# Patient Record
Sex: FEMALE | Race: WHITE | NOT HISPANIC OR LATINO
[De-identification: names, ages, dates, MRNs, and addresses within clinical notes are randomized per-mention and may not be internally consistent; named-entity substitution may affect disease eponyms.]

---

## 2019-07-19 ENCOUNTER — RX RENEWAL (OUTPATIENT)
Age: 34
End: 2019-07-19

## 2019-07-29 ENCOUNTER — RECORD ABSTRACTING (OUTPATIENT)
Age: 34
End: 2019-07-29

## 2019-07-29 DIAGNOSIS — Z83.79 FAMILY HISTORY OF OTHER DISEASES OF THE DIGESTIVE SYSTEM: ICD-10-CM

## 2019-07-29 DIAGNOSIS — Z80.8 FAMILY HISTORY OF MALIGNANT NEOPLASM OF OTHER ORGANS OR SYSTEMS: ICD-10-CM

## 2019-07-29 DIAGNOSIS — Z82.49 FAMILY HISTORY OF ISCHEMIC HEART DISEASE AND OTHER DISEASES OF THE CIRCULATORY SYSTEM: ICD-10-CM

## 2019-07-29 DIAGNOSIS — Z83.42 FAMILY HISTORY OF FAMILIAL HYPERCHOLESTEROLEMIA: ICD-10-CM

## 2019-07-29 DIAGNOSIS — T63.441A TOXIC EFFECT OF VENOM OF BEES, ACCIDENTAL (UNINTENTIONAL), INITIAL ENCOUNTER: ICD-10-CM

## 2019-07-29 DIAGNOSIS — Z87.42 PERSONAL HISTORY OF OTHER DISEASES OF THE FEMALE GENITAL TRACT: ICD-10-CM

## 2019-07-29 DIAGNOSIS — Z78.9 OTHER SPECIFIED HEALTH STATUS: ICD-10-CM

## 2019-07-29 DIAGNOSIS — Z86.39 PERSONAL HISTORY OF OTHER ENDOCRINE, NUTRITIONAL AND METABOLIC DISEASE: ICD-10-CM

## 2019-07-29 LAB — CYTOLOGY CVX/VAG DOC THIN PREP: NEGATIVE

## 2019-10-01 ENCOUNTER — APPOINTMENT (OUTPATIENT)
Dept: OBGYN | Facility: CLINIC | Age: 34
End: 2019-10-01
Payer: COMMERCIAL

## 2019-10-01 ENCOUNTER — TRANSCRIPTION ENCOUNTER (OUTPATIENT)
Age: 34
End: 2019-10-01

## 2019-10-01 PROCEDURE — 99395 PREV VISIT EST AGE 18-39: CPT

## 2019-10-03 LAB — HPV HIGH+LOW RISK DNA PNL CVX: NOT DETECTED

## 2019-10-03 NOTE — HISTORY OF PRESENT ILLNESS
[1 Year Ago] : 1 year ago [Good] : being in good health [Healthy Diet] : a healthy diet [Regular Exercise] : regular exercise [Definite:  ___ (Date)] : the last menstrual period was [unfilled] [Normal Amount/Duration] : was of a normal amount and duration [Spotting Between  Menses] : no spotting between menses [Menstrual Cramps] : no menstrual cramps [Regular Cycle Intervals] : periods have been regular [Sexually Active] : is sexually active [Monogamous] : is monogamous

## 2019-10-06 LAB — CYTOLOGY CVX/VAG DOC THIN PREP: NORMAL

## 2021-04-14 ENCOUNTER — LABORATORY RESULT (OUTPATIENT)
Age: 36
End: 2021-04-14

## 2021-04-14 ENCOUNTER — APPOINTMENT (OUTPATIENT)
Dept: OBGYN | Facility: CLINIC | Age: 36
End: 2021-04-14
Payer: COMMERCIAL

## 2021-04-14 DIAGNOSIS — Z01.419 ENCOUNTER FOR GYNECOLOGICAL EXAMINATION (GENERAL) (ROUTINE) W/OUT ABNORMAL FINDINGS: ICD-10-CM

## 2021-04-14 DIAGNOSIS — Z11.51 ENCOUNTER FOR SCREENING FOR HUMAN PAPILLOMAVIRUS (HPV): ICD-10-CM

## 2021-04-14 DIAGNOSIS — Z12.31 ENCOUNTER FOR SCREENING MAMMOGRAM FOR MALIGNANT NEOPLASM OF BREAST: ICD-10-CM

## 2021-04-14 PROCEDURE — 99072 ADDL SUPL MATRL&STAF TM PHE: CPT

## 2021-04-14 PROCEDURE — 99395 PREV VISIT EST AGE 18-39: CPT

## 2021-04-22 PROBLEM — Z01.419 ENCOUNTER FOR ANNUAL ROUTINE GYNECOLOGICAL EXAMINATION: Status: RESOLVED | Noted: 2021-04-14 | Resolved: 2021-04-28

## 2021-04-22 PROBLEM — Z11.51 SCREENING FOR HPV (HUMAN PAPILLOMAVIRUS): Status: ACTIVE | Noted: 2019-10-03

## 2021-04-22 NOTE — HISTORY OF PRESENT ILLNESS
[FreeTextEntry1] : 35 y o P0 female presents for routine annual GYN care\par Last pap smear 10/2019 NILM HPV negative\par Denies current GYN complaints\par Reports normal bowel and bladder function\par Sexually active w/o concerns: uses combination OC without adverse effects\par Has monthly menses: normal flow/duration

## 2021-08-25 LAB
CYTOLOGY CVX/VAG DOC THIN PREP: NORMAL
HPV HIGH+LOW RISK DNA PNL CVX: DETECTED

## 2022-02-22 ENCOUNTER — RX RENEWAL (OUTPATIENT)
Age: 37
End: 2022-02-22

## 2022-04-26 ENCOUNTER — LABORATORY RESULT (OUTPATIENT)
Age: 37
End: 2022-04-26

## 2022-04-26 ENCOUNTER — APPOINTMENT (OUTPATIENT)
Dept: OBGYN | Facility: CLINIC | Age: 37
End: 2022-04-26
Payer: COMMERCIAL

## 2022-04-26 VITALS
BODY MASS INDEX: 32.15 KG/M2 | DIASTOLIC BLOOD PRESSURE: 70 MMHG | SYSTOLIC BLOOD PRESSURE: 118 MMHG | HEIGHT: 65 IN | WEIGHT: 193 LBS

## 2022-04-26 PROCEDURE — 99395 PREV VISIT EST AGE 18-39: CPT

## 2022-04-26 NOTE — PHYSICAL EXAM
[Chaperone Declined] : Patient declined chaperone [Appropriately responsive] : appropriately responsive [Alert] : alert [No Acute Distress] : no acute distress [No Lymphadenopathy] : no lymphadenopathy [Soft] : soft [Non-tender] : non-tender [Non-distended] : non-distended [No HSM] : No HSM [No Lesions] : no lesions [No Mass] : no mass [Oriented x3] : oriented x3 [Examination Of The Breasts] : a normal appearance [No Masses] : no breast masses were palpable [Labia Minora] : normal [Labia Majora] : normal [Normal] : normal [Uterine Adnexae] : normal

## 2022-04-26 NOTE — HISTORY OF PRESENT ILLNESS
[TextBox_4] : 35yo P0 here for routine annual/ new to me, former LC then VD pt.\par Reg periods on OCP and happy with method.  She went on it as a teenager for heavy irreg periods and ovarian cysts an dhas had no problems since then.\par Not currently sexually active.  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

## 2022-05-05 LAB
CYTOLOGY CVX/VAG DOC THIN PREP: NORMAL
HPV HIGH+LOW RISK DNA PNL CVX: DETECTED

## 2022-05-25 ENCOUNTER — APPOINTMENT (OUTPATIENT)
Dept: BARIATRICS/WEIGHT MGMT | Facility: CLINIC | Age: 37
End: 2022-05-25
Payer: COMMERCIAL

## 2022-05-25 VITALS
HEIGHT: 65 IN | OXYGEN SATURATION: 99 % | DIASTOLIC BLOOD PRESSURE: 70 MMHG | SYSTOLIC BLOOD PRESSURE: 126 MMHG | BODY MASS INDEX: 32.32 KG/M2 | HEART RATE: 80 BPM | WEIGHT: 194 LBS | RESPIRATION RATE: 17 BRPM

## 2022-05-25 DIAGNOSIS — Z87.19 PERSONAL HISTORY OF OTHER DISEASES OF THE DIGESTIVE SYSTEM: ICD-10-CM

## 2022-05-25 PROCEDURE — 99205 OFFICE O/P NEW HI 60 MIN: CPT

## 2022-05-26 PROBLEM — Z87.19 HISTORY OF GASTROESOPHAGEAL REFLUX (GERD): Status: RESOLVED | Noted: 2022-05-26 | Resolved: 2022-05-26

## 2022-05-26 NOTE — REASON FOR VISIT
[Initial Consultation] : an initial consultation for [Obesity] : obesity [Hyperlipidemia] : hyperlipidemia

## 2022-06-02 RX ORDER — PEN NEEDLE, DIABETIC 32 GX 1/6"
32G X 4 MM NEEDLE, DISPOSABLE MISCELLANEOUS
Qty: 12 | Refills: 0 | Status: ACTIVE | COMMUNITY
Start: 2022-05-25 | End: 1900-01-01

## 2022-06-03 RX ORDER — LIRAGLUTIDE 6 MG/ML
18 INJECTION, SOLUTION SUBCUTANEOUS
Qty: 1 | Refills: 1 | Status: DISCONTINUED | COMMUNITY
Start: 2022-05-25 | End: 2022-06-03

## 2022-06-20 ENCOUNTER — APPOINTMENT (OUTPATIENT)
Dept: BARIATRICS/WEIGHT MGMT | Facility: CLINIC | Age: 37
End: 2022-06-20
Payer: COMMERCIAL

## 2022-06-20 VITALS
HEART RATE: 76 BPM | HEIGHT: 65 IN | SYSTOLIC BLOOD PRESSURE: 110 MMHG | DIASTOLIC BLOOD PRESSURE: 74 MMHG | RESPIRATION RATE: 16 BRPM | BODY MASS INDEX: 30.99 KG/M2 | OXYGEN SATURATION: 98 % | WEIGHT: 186 LBS

## 2022-06-20 PROCEDURE — 99214 OFFICE O/P EST MOD 30 MIN: CPT

## 2022-06-20 NOTE — PHYSICAL EXAM
[Obese, well nourished, in no acute distress] : obese, well nourished, in no acute distress [Normal] : affect appropriate [de-identified] : T

## 2022-06-20 NOTE — HISTORY OF PRESENT ILLNESS
[FreeTextEntry1] : 36F PMH class I obesity, HLD, s/p cholecystectomy, ?pre-DM, GERD who presents to weight management for follow-up.\par \par She initially presented ~4 weeks ago (5/2022) reporting hx of "yoyo" w/diet and weight on numerous episodes on weight watchers and has tried multiple other methods as well. Her weight tends to drop to ~160's but often come back to 190's. She had found it substantially more difficult to lose weight on her most recent episode of weight watchers than she had in the past. \par Her diet was noted for some processed/red meats (ie chicken sausage, steak), sweets/snack-food (ie oreos), artificial sweeteners and liquid kcal (ie wine). Some social snacking/grazing non-mindful eating noted. She was active through daily yoga and walking her dog. No clear concerns for sleep habits were observed.\par She was recommended to start general food tracking (less focus on kcal/macronutrients than general intake), pre-portion meals, and focus on un-processed plant-forward whole foods. We discussed eating mindfully and primarily at meals, minimizing artificial sweeteners, and consider reducing EtOH intake as well.\par She was prescribed semaglutide.\par \par Weight today: 186 lbs, BM 30.95\par Weight at Initial Visit (5/25/22): 194 lbs, BMI 32.28\par \par Medication: She started Ozempic 0.25 mg/wk on 6/2/22, has taken 3 doses. Notes she sometimes feels mild nausea in the sense that she feels queasy and averse to food, does not feel like eating. \par \par Sometimes skips meals because of lack of appetite. Has been eating meals out but is watching portions/diet. When she skips dinner she is hungrier at breakfast. Has some vacations coming up, is trying to plan how she will eat during that time.\par \par Exercise: Consistent with Yoga every morning for 30 minutes, walks dog daily.

## 2022-06-20 NOTE — HISTORY OF PRESENT ILLNESS
[FreeTextEntry1] : 36F PMH class I obesity, HLD, s/p cholecystectomy, ?pre-DM, GERD who presents to weight management for initial evaluation.\par \par She presents as a referral from a friend who was seen in the clinic.\par \par Weight/Diet History:\par She has done weight watchers many (6+) times over the past 8 years. Generally she bounces between her current weight (~190 lbs) and 160/165 lbs. More recently, she did Weight Watchers from Jan 2021 to Aug 2021, went to all meetings, tracked her diet, yet only lost only 5 lbs. She feels she wasn't losing as she had in the past. She gained weight up to 197 lbs, has tried other methods ie noom, MyFitnessPal.\par \par Weight today: 194 lbs, BMI 32.28\par \par Diet: Gets up 5:00 am, does yoga at 6 am. She is socially busy on weekends, her meals will all start later and she will do more social eating. \par \par Coffee w/oat milk or almond milk, plus splenda, in the car.\par B (8:30):  At work, low-fat greek yogurt and fruit\par L (12): Salad with asparagus, corn, cucumber. Sometimes chicken sausage\par D (6:00): [place], potatoes, asparagus. Sometimes steak or pasta. Then maybe oreos and wine\par Dessert: [facility] kiss\par Snack: Pear or cherries in afternoon\par Beverages: Water, coffee, some wine. Crystal lite\par Night-time eating:\par Fast-food/Restaurants: 1x/wk\par Food Journal: MyFitnessPal\par \par Exercise: Yoga every morning for 30 minutes. Walks dog daily.\par \par Sleep: Sleeps well, from 9 pm to 5 am. Thinks she may snore mildly, feels rested in the mornings. \par \par Social Hx: Denies tobacco or drug use. Drinks alcohol socially, estimates 4 glasses/wk (wine) during the year, 1 glass per day in the summer. 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 \par She has blood work that was performed on 3/29/22.

## 2022-06-20 NOTE — ASSESSMENT
[FreeTextEntry1] : 36F PMH class I obesity, HLD, s/p cholecystectomy, ?pre-DM, GERD who presents to weight management for follow-up.\par \par # Class I obesity c/b HLD, pre-DM, GERD, hx cholecystectomy: Weight today 186 lbs, BM 30.95, she has lost 8 lbs since initial visit <4 weeks ago. She has taken 3 doses of Ozempic 0.25 mg/wk and tolerating well, some mild nausea and early satiety noted. May be shifting toward earlier intake. Exercise is consistent. \par - Food log\par - Pre-portion meals\par - Focus on un-processed whole foods, plant-forward\par - Eating mindfully, primarily at meals\par - Minimize artificial sweeteners, water is best.\par - May benefit from reducing frequency of alcohol intake\par - C/w current measures\par - C/w Ozempic 0.25 mg/wk for at least 4 doses, plan to increase to 0.5 mg/wk at 5th dose, we discussed that she may choose to stay on 0.25 mg/wk dose for an extended period of time if she continues to either have side effects or effective weight loss at that dose. \par - F/u in ~4 weeks.

## 2022-06-20 NOTE — ASSESSMENT
[FreeTextEntry1] : 36F PMH class I obesity, HLD, s/p cholecystectomy, ?pre-DM, GERD who presents to weight management for initial evaluation.\par \par # Class I obesity c/b HLD, pre-DM, GERD, hx cholecystectomy: Weight today 194 lbs, BMI 32.28. History of fluctuating between 160's and 190's lbs, primarily has used weight watchers, no longer having success despite adhering to dietary modifications, tracking, and social support. Her diet is notable for some processed/red meats (ie chicken sausage, steak), sweets/snack-food (ie oreos), artificial sweeteners and liquid kcal (ie wine). Social snacking/grazing and eating often not mindful. She does yoga daily, and walks her dog. Sleep habits appear healthy.\par - Recommend restarting food log, less focus on kcal/macronutrients\par - Pre-portion meals\par - Discussed dietary recommendations, focus on un-processed whole foods, plant-forward\par - Eating mindfully, primarily at meals\par - Minimize artificial sweeteners, water is best.\par - May benefit from reducing frequency of alcohol intake\par \par \par Patient provided info\par [facility], [phone] (mail order pharmacy)\par prior authorization [id number]

## 2022-07-25 ENCOUNTER — APPOINTMENT (OUTPATIENT)
Dept: BARIATRICS/WEIGHT MGMT | Facility: CLINIC | Age: 37
End: 2022-07-25

## 2022-07-25 ENCOUNTER — NON-APPOINTMENT (OUTPATIENT)
Age: 37
End: 2022-07-25

## 2022-07-25 VITALS — HEIGHT: 65 IN | WEIGHT: 180 LBS | BODY MASS INDEX: 29.99 KG/M2

## 2022-07-25 PROCEDURE — 99213 OFFICE O/P EST LOW 20 MIN: CPT | Mod: GC,95

## 2022-07-26 NOTE — ASSESSMENT
[FreeTextEntry1] : 36F PMH class I obesity, HLD, s/p cholecystectomy, ?pre-DM, GERD who presents to weight management for follow-up.\par \par # Class I obesity c/b HLD, pre-DM, GERD, hx cholecystectomy: Weight today 180 lbs. BMI 29.95. She is tolerating Ozempic 0.5 mg w/o side effect, noting good effect on appetite and weight loss. She is incorporating healthier dietary habits and maintaining physical activity, some limitations since last visit due to vacations. \par - Food log\par - Pre-portion meals\par - Focus on un-processed whole foods, plant-forward\par - Eating mindfully, primarily at meals\par - Minimize artificial sweeteners, water is best.\par - May benefit from reducing frequency of alcohol intake\par - C/w current measures\par - C/w Ozempic 0.5 mg/wk for at least 4 doses, plan to increase to 1.0 mg/wk after 4th dose\par - F/u in ~4-6 weeks.

## 2022-07-26 NOTE — HISTORY OF PRESENT ILLNESS
[Home] : at home, [unfilled] , at the time of the visit. [Medical Office: ([facility])___] : at the medical office located in  [Verbal consent obtained from patient] : the patient, [unfilled] [FreeTextEntry1] : 36F PMH class I obesity, HLD, s/p cholecystectomy, ?pre-DM, GERD who presents to weight management for follow-up.\par \par She initially presented 5/2022 reporting hx of "yoyo" w/diet and weight on numerous episodes on weight watchers and has tried multiple other methods as well. Her weight tends to drop to ~160's but often come back to 190's. She had found it substantially more difficult to lose weight on her most recent episode of weight watchers than she had in the past. \par Her diet was noted for some processed/red meats (ie chicken sausage, steak), sweets/snack-food (ie oreos), artificial sweeteners and liquid kcal (ie wine). Some social snacking/grazing non-mindful eating noted. She was active through daily yoga and walking her dog. No clear concerns for sleep habits were observed.\par She was recommended to start general food tracking (less focus on kcal/macronutrients than general intake), pre-portion meals, and focus on un-processed plant-forward whole foods. We discussed eating mindfully and primarily at meals, minimizing artificial sweeteners, and consider reducing EtOH intake as well.\par She was prescribed semaglutide.\par \par Weight today: 180 lbs. BMI 29.95\par Weight at Last Visit (6/20/22): 186 lbs, BM 30.95\par Weight at Initial Visit (5/25/22): 194 lbs, BMI 32.28\par \par She has been on Ozempic 0.5 mg/wk s/p 3rd dose. Some days finds it affects her eating habits significantly but other days doesn't feel like it affects her as much. \par \par She had been traveling a lot since last seen, so she was less able to focus on dietary and exercise interventions. \par She did some swimming and yoga most days while traveling.

## 2022-07-29 ENCOUNTER — APPOINTMENT (OUTPATIENT)
Dept: OBGYN | Facility: CLINIC | Age: 37
End: 2022-07-29
Payer: COMMERCIAL

## 2022-07-29 VITALS
BODY MASS INDEX: 30.16 KG/M2 | HEIGHT: 65 IN | SYSTOLIC BLOOD PRESSURE: 110 MMHG | WEIGHT: 181 LBS | DIASTOLIC BLOOD PRESSURE: 74 MMHG

## 2022-07-29 PROCEDURE — 57454 BX/CURETT OF CERVIX W/SCOPE: CPT

## 2022-07-29 PROCEDURE — 81025 URINE PREGNANCY TEST: CPT | Mod: 1L

## 2022-07-29 RX ORDER — HUMAN PAPILLOMAVIRUS 9-VALENT VACCINE, RECOMBINANT 30; 40; 60; 40; 20; 20; 20; 20; 20 UG/.5ML; UG/.5ML; UG/.5ML; UG/.5ML; UG/.5ML; UG/.5ML; UG/.5ML; UG/.5ML; UG/.5ML
INJECTION, SUSPENSION INTRAMUSCULAR
Qty: 1 | Refills: 2 | Status: ACTIVE | COMMUNITY
Start: 2022-07-29 | End: 1900-01-01

## 2022-08-11 ENCOUNTER — NON-APPOINTMENT (OUTPATIENT)
Age: 37
End: 2022-08-11

## 2022-09-25 RX ORDER — HUMAN PAPILLOMAVIRUS 9-VALENT VACCINE, RECOMBINANT 30; 40; 60; 40; 20; 20; 20; 20; 20 UG/.5ML; UG/.5ML; UG/.5ML; UG/.5ML; UG/.5ML; UG/.5ML; UG/.5ML; UG/.5ML; UG/.5ML
INJECTION, SUSPENSION INTRAMUSCULAR
Qty: 1 | Refills: 1 | Status: ACTIVE | COMMUNITY
Start: 2022-08-04

## 2022-09-26 ENCOUNTER — APPOINTMENT (OUTPATIENT)
Dept: BARIATRICS/WEIGHT MGMT | Facility: CLINIC | Age: 37
End: 2022-09-26

## 2022-09-26 VITALS — BODY MASS INDEX: 28.49 KG/M2 | WEIGHT: 171 LBS | HEIGHT: 65 IN

## 2022-09-26 PROCEDURE — 99214 OFFICE O/P EST MOD 30 MIN: CPT | Mod: 95

## 2022-09-27 NOTE — ASSESSMENT
[FreeTextEntry1] : 36F PMH class I obesity, HLD, s/p cholecystectomy, ?pre-DM, GERD who presents to weight management for follow-up.\par \par # Class I obesity c/b HLD, pre-DM, GERD, hx cholecystectomy: Weight today 171 lbs. BMI  28.46, has lost 9 lbs since last visit 2 months ago, and 23 lbs since initial visit 4 months ago. She is tolerating Ozempic 1.0 well. Some maladaptive dietary behaviors including eating more sweets instead of more filling fibrous/protein-rich foods, but eating more of food earlier. \par - Food log\par - Start with vegetables / protein in her meal\par - Pre-portion meals\par - Focus on un-processed whole foods, plant-forward\par - Eating mindfully, primarily at meals\par - Minimize artificial sweeteners, water is best.\par - C/w current measures\par - C/w Ozempic 1.0 mg/wk\par - F/u in ~4-8 weeks.

## 2022-09-27 NOTE — HISTORY OF PRESENT ILLNESS
[Home] : at home, [unfilled] , at the time of the visit. [Medical Office: ([facility])___] : at the medical office located in  [Verbal consent obtained from patient] : the patient, [unfilled] [FreeTextEntry1] : 36F PMH class I obesity, HLD, s/p cholecystectomy, ?pre-DM, GERD who presents to weight management for follow-up.\par \par She initially presented 5/2022 reporting hx of "yoyo" w/diet and weight on numerous episodes on weight watchers and has tried multiple other methods as well. Her weight tends to drop to ~160's but often come back to 190's. She had found it substantially more difficult to lose weight on her most recent episode of weight watchers than she had in the past. \par Her diet was noted for some processed/red meats (ie chicken sausage, steak), sweets/snack-food (ie oreos), artificial sweeteners and liquid kcal (ie wine). Some social snacking/grazing non-mindful eating noted. She was active through daily yoga and walking her dog. No clear concerns for sleep habits were observed.\par She was recommended to start general food tracking (less focus on kcal/macronutrients than general intake), pre-portion meals, and focus on un-processed plant-forward whole foods. We discussed eating mindfully and primarily at meals, minimizing artificial sweeteners, and consider reducing EtOH intake as well.\par She was prescribed semaglutide.\par \par Weight today: 171 lbs. BMI  28.46\par Weight at Last Visit (7/25/22): 180 lbs. BMI 29.95\par Weight at Initial Visit (5/25/22): 194 lbs, BMI 32.28\par \par She has been on Ozempic 1.0 mg/wk, on 2nd pen. At beginning more side effects, but seems to be adapting.\par Now craving sweets more, less craving vegetables/protein.\par \par Eating more of her calories in the morning

## 2022-09-27 NOTE — PHYSICAL EXAM
[Obese, well nourished, in no acute distress] : obese, well nourished, in no acute distress [Normal] : affect appropriate [de-identified] : TEB appointment

## 2022-11-06 ENCOUNTER — NON-APPOINTMENT (OUTPATIENT)
Age: 37
End: 2022-11-06

## 2022-11-09 ENCOUNTER — APPOINTMENT (OUTPATIENT)
Dept: BARIATRICS/WEIGHT MGMT | Facility: CLINIC | Age: 37
End: 2022-11-09

## 2022-11-09 VITALS — HEIGHT: 65 IN | BODY MASS INDEX: 27.89 KG/M2 | WEIGHT: 167.4 LBS

## 2022-11-09 PROCEDURE — 99214 OFFICE O/P EST MOD 30 MIN: CPT | Mod: 95

## 2022-11-09 NOTE — PHYSICAL EXAM
[Obese, well nourished, in no acute distress] : obese, well nourished, in no acute distress [Normal] : affect appropriate [de-identified] : TEB appointment

## 2022-11-09 NOTE — ASSESSMENT
[FreeTextEntry1] : 36F PMH class I obesity, HLD, s/p cholecystectomy, ?pre-DM, GERD who presents to weight management for follow-up.\par \par # Class I obesity c/b HLD, pre-DM, GERD, hx cholecystectomy: Weight today 167.4 lbs, BMI 27.86, was 194 lbs at initial visit. Tolerating Ozempic 1.0 mg well for the past few months, has been maintaining her current weight range. Physically active. \par - Food log\par - Start with vegetables / protein in her meal\par - Pre-portion meals\par - Focus on un-processed whole foods, plant-forward\par - Eating mindfully, primarily at meals\par - Minimize artificial sweeteners, water is best.\par - Increase Ozempic to 2.0 mg/wk\par - F/u in ~4-8 weeks.

## 2022-11-09 NOTE — HISTORY OF PRESENT ILLNESS
[Home] : at home, [unfilled] , at the time of the visit. [Medical Office: ([facility])___] : at the medical office located in  [Verbal consent obtained from patient] : the patient, [unfilled] [FreeTextEntry1] : 36F PMH class I obesity, HLD, s/p cholecystectomy, ?pre-DM, GERD who presents to weight management for follow-up.\par \par She initially presented 5/2022 reporting hx of "yoyo" w/diet and weight on numerous episodes on weight watchers and has tried multiple other methods as well. Her weight tends to drop to ~160's but often come back to 190's. She had found it substantially more difficult to lose weight on her most recent episode of weight watchers than she had in the past. \par Her diet was noted for some processed/red meats (ie chicken sausage, steak), sweets/snack-food (ie oreos), artificial sweeteners and liquid kcal (ie wine). Some social snacking/grazing non-mindful eating noted. She was active through daily yoga and walking her dog. No clear concerns for sleep habits were observed.\par She was recommended to start general food tracking (less focus on kcal/macronutrients than general intake), pre-portion meals, and focus on un-processed plant-forward whole foods. We discussed eating mindfully and primarily at meals, minimizing artificial sweeteners, and consider reducing EtOH intake as well.\par She was prescribed semaglutide.\par \par Weight today: 167.4 lbs, BMI 27.86\par Weight at Last Visit (9/26/22): 171 lbs. BMI 28.46\par Weight at Initial Visit (5/25/22): 194 lbs, BMI 32.28\par \par Last visit we decided to c/w Ozempic 1.0 mg/wk for longer w/o increasing the dose, she subsequently lost ~3 lbs and feels her weight has fluctuated in that range for the past few weeks. Feels she has lost the disinterest in food that used to happen earlier, and doesn't feel anything until she feels sick after eating too much. \par \par She maintains regular physical activity, is doing yoga 30 min daily during the week and 45 min on the weekends. Walks her dog. \par

## 2022-11-18 ENCOUNTER — RX RENEWAL (OUTPATIENT)
Age: 37
End: 2022-11-18

## 2022-12-12 ENCOUNTER — APPOINTMENT (OUTPATIENT)
Dept: BARIATRICS/WEIGHT MGMT | Facility: CLINIC | Age: 37
End: 2022-12-12

## 2022-12-12 VITALS — BODY MASS INDEX: 26.66 KG/M2 | HEIGHT: 65 IN | WEIGHT: 160 LBS

## 2022-12-12 PROCEDURE — 99214 OFFICE O/P EST MOD 30 MIN: CPT | Mod: 95

## 2022-12-13 NOTE — PHYSICAL EXAM
[Obese, well nourished, in no acute distress] : obese, well nourished, in no acute distress [Normal] : affect appropriate [de-identified] : TEB appointment

## 2022-12-13 NOTE — ASSESSMENT
[FreeTextEntry1] : 37F PMH class I obesity, HLD, s/p cholecystectomy, ?pre-DM, GERD who presents to weight management for follow-up.\par \par # Class I obesity c/b HLD, pre-DM, GERD, hx cholecystectomy: Weight today 160 lbs, BMI 26.63, down ~7.5 lbs since last visit 5 weeks ago, and 34 lbs since initial visit. On Ozempic 2 mg/wk and tolerating well. Continues with regular physical activity. \par - Food log\par - Start with vegetables / protein in her meal\par - Pre-portion meals\par - Focus on un-processed whole foods, plant-forward\par - Eating mindfully, primarily at meals\par - Minimize artificial sweeteners, water is best.\par - C/w Ozempic 2.0 mg/wk\par - F/u in ~4-8 weeks.

## 2022-12-13 NOTE — HISTORY OF PRESENT ILLNESS
[Home] : at home, [unfilled] , at the time of the visit. [Medical Office: ([facility])___] : at the medical office located in  [Verbal consent obtained from patient] : the patient, [unfilled] [FreeTextEntry1] : 37F PMH class I obesity, HLD, s/p cholecystectomy, ?pre-DM, GERD who presents to weight management for follow-up.\par \par She initially presented 5/2022 reporting hx of "yoyo" w/diet and weight on numerous episodes on weight watchers and has tried multiple other methods as well. Her weight tends to drop to ~160's but often come back to 190's. She had found it substantially more difficult to lose weight on her most recent episode of weight watchers than she had in the past. \par Her diet was noted for some processed/red meats (ie chicken sausage, steak), sweets/snack-food (ie oreos), artificial sweeteners and liquid kcal (ie wine). Some social snacking/grazing non-mindful eating noted. She was active through daily yoga and walking her dog. No clear concerns for sleep habits were observed.\par She was recommended to start general food tracking (less focus on kcal/macronutrients than general intake), pre-portion meals, and focus on un-processed plant-forward whole foods. We discussed eating mindfully and primarily at meals, minimizing artificial sweeteners, and consider reducing EtOH intake as well.\par She was prescribed semaglutide.\par \par Weight today: 160 lbs, BMI 26.63\par Weight at Last Visit (11/9/22): 167.4 lbs, BMI 27.86\par Weight at Initial Visit (5/25/22): 194 lbs, BMI 32.28\par \par She bumped up her dose to Ozempic 2 mg last visit. Appetite significantly reduced. Lost significant weight despite holidays, wasn't hungry. Continues regular physical activity.

## 2023-01-25 ENCOUNTER — NON-APPOINTMENT (OUTPATIENT)
Age: 38
End: 2023-01-25

## 2023-04-03 ENCOUNTER — APPOINTMENT (OUTPATIENT)
Dept: BARIATRICS/WEIGHT MGMT | Facility: CLINIC | Age: 38
End: 2023-04-03
Payer: COMMERCIAL

## 2023-04-03 ENCOUNTER — NON-APPOINTMENT (OUTPATIENT)
Age: 38
End: 2023-04-03

## 2023-04-03 VITALS — WEIGHT: 153 LBS | BODY MASS INDEX: 25.49 KG/M2 | HEIGHT: 65 IN

## 2023-04-03 PROCEDURE — 99214 OFFICE O/P EST MOD 30 MIN: CPT | Mod: 95

## 2023-04-03 RX ORDER — SEMAGLUTIDE 1.34 MG/ML
4 INJECTION, SOLUTION SUBCUTANEOUS
Qty: 2 | Refills: 2 | Status: DISCONTINUED | COMMUNITY
Start: 2022-05-25 | End: 2023-04-03

## 2023-04-03 NOTE — PHYSICAL EXAM
[Obese, well nourished, in no acute distress] : obese, well nourished, in no acute distress [de-identified] : TEB visit

## 2023-04-03 NOTE — ASSESSMENT
[FreeTextEntry1] : 37F PMH class I obesity, HLD, s/p cholecystectomy, ?pre-DM, GERD who presents to weight management for follow-up.\par \par # Class I obesity c/b HLD, pre-DM, GERD, hx cholecystectomy: Weight today 153 lbs, BMI 25.46, has lost 7 lbs since last visit and 41 lbs since initial visit. She feels comfortable at her current weight, doesn't want to lose more, is eating balanced. She had side effects at Ozempic 2.0 mg/wk but is doing well on 1.5 mg/wk. Yoga daily.\par - Food log\par - Start with vegetables / protein in her meal\par - Pre-portion meals\par - Focus on un-processed whole foods, plant-forward\par - Eating mindfully, primarily at meals\par - Minimize artificial sweeteners, water is best.\par - C/w Ozempic 1.5 mg/wk, may de-escalate to 1.0 mg/wk in coming months. \par - F/u in 2-3 months

## 2023-04-03 NOTE — HISTORY OF PRESENT ILLNESS
[Home] : at home, [unfilled] , at the time of the visit. [Medical Office: ([facility])___] : at the medical office located in  [Verbal consent obtained from patient] : the patient, [unfilled] [FreeTextEntry1] : 37F PMH class I obesity, HLD, s/p cholecystectomy, ?pre-DM, GERD who presents to weight management for follow-up.\par \par She initially presented 5/2022 reporting hx of "yoyo" w/diet and weight on numerous episodes on weight watchers and has tried multiple other methods as well. Her weight tends to drop to ~160's but often come back to 190's. She had found it substantially more difficult to lose weight on her most recent episode of weight watchers than she had in the past. \par Her diet was noted for some processed/red meats (ie chicken sausage, steak), sweets/snack-food (ie oreos), artificial sweeteners and liquid kcal (ie wine). Some social snacking/grazing non-mindful eating noted. She was active through daily yoga and walking her dog. No clear concerns for sleep habits were observed.\par She was recommended to start general food tracking (less focus on kcal/macronutrients than general intake), pre-portion meals, and focus on un-processed plant-forward whole foods. We discussed eating mindfully and primarily at meals, minimizing artificial sweeteners, and consider reducing EtOH intake as well.\par She was prescribed semaglutide.\par \par Weight today: 153 lbs, BMI 25.46\par Weight at Last Visit (12/12/22): 160 lbs, BMI 26.63\par Weight at Initial Visit (5/25/22): 194 lbs, BMI 32.28\par \par She has been taking Ozempic 1.5 mg/wk, was on 2 mg/wk but had significant side effects and was much more comfortable on 1.5 mg. She has lost more weight and feels comfortable at her current weight without losing more. Continues regular physical activity (30 min/d yoga).

## 2023-04-11 ENCOUNTER — LABORATORY RESULT (OUTPATIENT)
Age: 38
End: 2023-04-11

## 2023-04-11 ENCOUNTER — APPOINTMENT (OUTPATIENT)
Dept: FAMILY MEDICINE | Facility: CLINIC | Age: 38
End: 2023-04-11
Payer: COMMERCIAL

## 2023-04-11 ENCOUNTER — NON-APPOINTMENT (OUTPATIENT)
Age: 38
End: 2023-04-11

## 2023-04-11 VITALS
DIASTOLIC BLOOD PRESSURE: 70 MMHG | HEART RATE: 66 BPM | SYSTOLIC BLOOD PRESSURE: 110 MMHG | HEIGHT: 65 IN | BODY MASS INDEX: 25.99 KG/M2 | TEMPERATURE: 97.8 F | WEIGHT: 156 LBS | OXYGEN SATURATION: 99 %

## 2023-04-11 PROCEDURE — 36415 COLL VENOUS BLD VENIPUNCTURE: CPT

## 2023-04-11 PROCEDURE — 99385 PREV VISIT NEW AGE 18-39: CPT | Mod: 25

## 2023-04-11 NOTE — HEALTH RISK ASSESSMENT
[Very Good] : ~his/her~  mood as very good [Yes] : Yes [Monthly or less (1 pt)] : Monthly or less (1 point) [Never (0 pts)] : Never (0 points) [No] : In the past 12 months have you used drugs other than those required for medical reasons? No [No falls in past year] : Patient reported no falls in the past year [0] : 2) Feeling down, depressed, or hopeless: Not at all (0) [PHQ-2 Negative - No further assessment needed] : PHQ-2 Negative - No further assessment needed [None] : None [Alone] : lives alone [Employed] : employed [Single] : single [Feels Safe at Home] : Feels safe at home [Never] : Never [HIV Test offered] : HIV Test offered [de-identified] : social drinker [Audit-CScore] : 1 [de-identified] : yoga everyday [de-identified] : balanced diet [[id number]] : 0 [Change in mental status noted] : No change in mental status noted [Language] : denies difficulty with language [Behavior] : denies difficulty with behavior [Learning/Retaining New Information] : denies difficulty learning/retaining new information [Handling Complex Tasks] : denies difficulty handling complex tasks [Reasoning] : denies difficulty with reasoning [Spatial Ability and Orientation] : denies difficulty with spatial ability and orientation [Sexually Active] : not sexually active [PapSmearDate] : 2023 [PapSmearComments] : HPV follow up with gyn [de-identified] : 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

## 2023-04-11 NOTE — REVIEW OF SYSTEMS
[Negative] : Heme/Lymph [FreeTextEntry4] : patient recently recovered from strep throat and currently asymptomatic

## 2023-04-11 NOTE — ASSESSMENT
[FreeTextEntry1] : patient with appropriate preventative UTD \par no concerns on exam \par age appropriate counseling given \par \par New patient\par on Ozempic - discussed risk benefit of Ozempic- doing well-0 cont wt loss management\par \par on atorvastatin due to very large amount of lipid \par - rec monitoring today \par - continue atorvastatin based on sig family history \par \par prediabetes- on Ozempic now check a1c \par \par Gardasil- pt will check with insurance can administer in office

## 2023-04-11 NOTE — PHYSICAL EXAM
[No Abdominal Bruit] : a ~M bruit was not heard ~T in the abdomen [Pedal Pulses Present] : the pedal pulses are present [Normal Posterior Cervical Nodes] : no posterior cervical lymphadenopathy [No Edema] : there was no peripheral edema [Normal] : no rash [de-identified] : single <1 cm lymph node

## 2023-04-11 NOTE — HISTORY OF PRESENT ILLNESS
[FreeTextEntry1] : new patient [de-identified] : 37 year of age F presents to establish care\par \par obesity- patient currently taking Ozempic and has lost weight. Mentions father had multiple heart attacks in his 40s. \par \par Denies any chest pain, shortness of breath,  nausea, vomiting , diarrhea or any urinary symptoms. \par \par \par \par \par \par

## 2023-04-18 ENCOUNTER — APPOINTMENT (OUTPATIENT)
Age: 38
End: 2023-04-18
Payer: COMMERCIAL

## 2023-04-18 PROCEDURE — 90471 IMMUNIZATION ADMIN: CPT

## 2023-04-18 PROCEDURE — 90651 9VHPV VACCINE 2/3 DOSE IM: CPT

## 2023-05-02 ENCOUNTER — APPOINTMENT (OUTPATIENT)
Dept: OBGYN | Facility: CLINIC | Age: 38
End: 2023-05-02
Payer: COMMERCIAL

## 2023-05-02 VITALS
HEIGHT: 65 IN | DIASTOLIC BLOOD PRESSURE: 70 MMHG | BODY MASS INDEX: 25.66 KG/M2 | WEIGHT: 154 LBS | SYSTOLIC BLOOD PRESSURE: 112 MMHG

## 2023-05-02 PROCEDURE — 99395 PREV VISIT EST AGE 18-39: CPT

## 2023-05-02 NOTE — HISTORY OF PRESENT ILLNESS
[TextBox_4] : 38yo P0 here for routine annual/ new to me, former LC then VD pt.\par Has lost 40lbs with medical weight loss/ Dr. Pinedo and is on Ozempic.\par Reg periods on OCP and happy with method.  She went on it as a teenager for heavy irreg periods and ovarian cysts and has had no problems since then.\par Not currently sexually active.  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

## 2023-06-21 ENCOUNTER — APPOINTMENT (OUTPATIENT)
Age: 38
End: 2023-06-21
Payer: COMMERCIAL

## 2023-06-21 PROCEDURE — 90651 9VHPV VACCINE 2/3 DOSE IM: CPT

## 2023-06-21 PROCEDURE — 90471 IMMUNIZATION ADMIN: CPT

## 2023-08-01 ENCOUNTER — RX RENEWAL (OUTPATIENT)
Age: 38
End: 2023-08-01

## 2023-08-10 ENCOUNTER — APPOINTMENT (OUTPATIENT)
Dept: BARIATRICS/WEIGHT MGMT | Facility: CLINIC | Age: 38
End: 2023-08-10
Payer: COMMERCIAL

## 2023-08-10 VITALS — BODY MASS INDEX: 26.16 KG/M2 | HEIGHT: 65 IN | WEIGHT: 157 LBS

## 2023-08-10 PROCEDURE — 99215 OFFICE O/P EST HI 40 MIN: CPT | Mod: 95

## 2023-08-10 NOTE — HISTORY OF PRESENT ILLNESS
[Home] : at home, [unfilled] , at the time of the visit. [Medical Office: ([facility])___] : at the medical office located in  [Verbal consent obtained from patient] : the patient, [unfilled] [FreeTextEntry1] : 37F PMH class I obesity, HLD, s/p cholecystectomy, ?pre-DM, GERD who presents to weight management for follow-up.  She initially presented 5/2022 reporting hx of "yoyo" w/diet and weight on numerous episodes on weight watchers and has tried multiple other methods as well. Her weight tends to drop to ~160's but often come back to 190's. She had found it substantially more difficult to lose weight on her most recent episode of weight watchers than she had in the past.  Her diet was noted for some processed/red meats (ie chicken sausage, steak), sweets/snack-food (ie oreos), artificial sweeteners and liquid kcal (ie wine). Some social snacking/grazing non-mindful eating noted. She was active through daily yoga and walking her dog. No clear concerns for sleep habits were observed. She was recommended to start general food tracking (less focus on kcal/macronutrients than general intake), pre-portion meals, and focus on un-processed plant-forward whole foods. We discussed eating mindfully and primarily at meals, minimizing artificial sweeteners, and consider reducing EtOH intake as well. She was prescribed semaglutide.  Weight today: 157 lbs, 26.13 Weight at Last Visit (4/3/23): 153 lbs, BMI 25.46 Weight at Initial Visit (5/25/22): 194 lbs, BMI 32.28  She decreased her Ozempic from 1.5 mg/wk to 1.25 mg/wk, which coincided with the summer and poorer eating habits.

## 2023-08-10 NOTE — ASSESSMENT
[FreeTextEntry1] : 37F PMH class I obesity, HLD, s/p cholecystectomy, ?pre-DM, GERD who presents to weight management for follow-up.  # Class I obesity c/b HLD, pre-DM, GERD, hx cholecystectomy: Weight today 157 lbs, 26.13, has regained 4 lbs since last visit 4 months ago, and is down 27 lbs since initial visit. Doing well on Ozempic 1.25 mg/wk, feels some reduced effect but also notes her eating/drinking habits are worse in the summer.  - Food log - Start with vegetables / protein in her meal - Pre-portion meals - Focus on un-processed whole foods, plant-forward - Eating mindfully, primarily at meals - Minimize artificial sweeteners, water is best. - May resume Ozempic 1.5 mg/wk - F/u in 2-3 months

## 2023-08-10 NOTE — PHYSICAL EXAM
[Obese, well nourished, in no acute distress] : obese, well nourished, in no acute distress [de-identified] : TEB visit

## 2023-10-18 ENCOUNTER — APPOINTMENT (OUTPATIENT)
Age: 38
End: 2023-10-18
Payer: COMMERCIAL

## 2023-10-18 DIAGNOSIS — Z23 ENCOUNTER FOR IMMUNIZATION: ICD-10-CM

## 2023-10-18 PROCEDURE — G0008: CPT

## 2023-10-18 PROCEDURE — 90686 IIV4 VACC NO PRSV 0.5 ML IM: CPT

## 2024-01-09 ENCOUNTER — APPOINTMENT (OUTPATIENT)
Dept: BARIATRICS/WEIGHT MGMT | Facility: CLINIC | Age: 39
End: 2024-01-09
Payer: COMMERCIAL

## 2024-01-09 VITALS — WEIGHT: 160 LBS | HEIGHT: 65 IN | BODY MASS INDEX: 26.66 KG/M2

## 2024-01-09 PROCEDURE — 99214 OFFICE O/P EST MOD 30 MIN: CPT | Mod: 95

## 2024-01-09 RX ORDER — PEN NEEDLE, DIABETIC 32 GX 1/6"
32G X 4 MM NEEDLE, DISPOSABLE MISCELLANEOUS
Qty: 1 | Refills: 3 | Status: ACTIVE | COMMUNITY
Start: 2024-01-09 | End: 1900-01-01

## 2024-01-09 NOTE — PHYSICAL EXAM
[Obese, well nourished, in no acute distress] : obese, well nourished, in no acute distress [de-identified] : TEB visit

## 2024-01-09 NOTE — HISTORY OF PRESENT ILLNESS
[Home] : at home, [unfilled] , at the time of the visit. [Medical Office: ([facility])___] : at the medical office located in  [Verbal consent obtained from patient] : the patient, [unfilled] [FreeTextEntry1] : 38F PMH class I obesity, HLD, s/p cholecystectomy, ?pre-DM, GERD who presents to weight management for follow-up.  She initially presented 5/2022 reporting hx of "yoyo" w/diet and weight on numerous episodes on weight watchers and has tried multiple other methods as well. Her weight tends to drop to ~160's but often come back to 190's. She had found it substantially more difficult to lose weight on her most recent episode of weight watchers than she had in the past.  Her diet was noted for some processed/red meats (ie chicken sausage, steak), sweets/snack-food (ie oreos), artificial sweeteners and liquid kcal (ie wine). Some social snacking/grazing non-mindful eating noted. She was active through daily yoga and walking her dog. No clear concerns for sleep habits were observed. She was recommended to start general food tracking (less focus on kcal/macronutrients than general intake), pre-portion meals, and focus on un-processed plant-forward whole foods. We discussed eating mindfully and primarily at meals, minimizing artificial sweeteners, and consider reducing EtOH intake as well. She was prescribed semaglutide.  Weight today: 160 lbs, BMI 26.63 Weight at Last Visit (8/10/3/23): 157 lbs, 26.13 Weight at Initial Visit (5/25/22): 194 lbs, BMI 32.28  She had gone down to 1 mg Ozempic and was able to maintain her weight for several months, but just gained a couple of 'holiday pounds', and up to 160 lbs.   Read how not to diet, trying to get herself back in check, more fruit at breakfast, vegetables at lunch. Consistent with yoga.

## 2024-01-09 NOTE — ASSESSMENT
[FreeTextEntry1] : 38F PMH class I obesity, HLD, s/p cholecystectomy, ?pre-DM, GERD who presents to weight management for follow-up.  # Class I obesity c/b HLD, pre-DM, GERD, hx cholecystectomy: Weight today 160 lbs, BMI 26.63, she has maintained her weight ~155 lbs on just 1 mg of Ozempic for the past several months, but regained ~5 lbs over the holiday season during which she struggled a bit more. Reports consistency with exercise - yoga.  - Food log - Start with vegetables / protein in her meal - Pre-portion meals - Focus on un-processed whole foods, plant-forward - Eating mindfully, primarily at meals - Minimize artificial sweeteners, water is best. - May resume Ozempic 1.5 mg/wk, with goal ideally to get back to 1 mg maintenance - F/u in 3-6 months.

## 2024-02-20 ENCOUNTER — RX RENEWAL (OUTPATIENT)
Age: 39
End: 2024-02-20

## 2024-02-20 RX ORDER — ATORVASTATIN CALCIUM 20 MG/1
20 TABLET, FILM COATED ORAL
Qty: 90 | Refills: 1 | Status: ACTIVE | COMMUNITY
Start: 2022-03-07 | End: 1900-01-01

## 2024-03-18 ENCOUNTER — RX RENEWAL (OUTPATIENT)
Age: 39
End: 2024-03-18

## 2024-04-12 ENCOUNTER — APPOINTMENT (OUTPATIENT)
Dept: FAMILY MEDICINE | Facility: CLINIC | Age: 39
End: 2024-04-12
Payer: COMMERCIAL

## 2024-04-12 VITALS
BODY MASS INDEX: 26.99 KG/M2 | WEIGHT: 162 LBS | HEART RATE: 67 BPM | DIASTOLIC BLOOD PRESSURE: 70 MMHG | HEIGHT: 65 IN | SYSTOLIC BLOOD PRESSURE: 120 MMHG | OXYGEN SATURATION: 99 %

## 2024-04-12 DIAGNOSIS — Z86.19 PERSONAL HISTORY OF OTHER INFECTIOUS AND PARASITIC DISEASES: ICD-10-CM

## 2024-04-12 DIAGNOSIS — N94.6 DYSMENORRHEA, UNSPECIFIED: ICD-10-CM

## 2024-04-12 DIAGNOSIS — R59.9 ENLARGED LYMPH NODES, UNSPECIFIED: ICD-10-CM

## 2024-04-12 DIAGNOSIS — R92.30 DENSE BREASTS, UNSPECIFIED: ICD-10-CM

## 2024-04-12 DIAGNOSIS — E04.1 NONTOXIC SINGLE THYROID NODULE: ICD-10-CM

## 2024-04-12 DIAGNOSIS — Z00.00 ENCOUNTER FOR GENERAL ADULT MEDICAL EXAMINATION W/OUT ABNORMAL FINDINGS: ICD-10-CM

## 2024-04-12 PROCEDURE — 99214 OFFICE O/P EST MOD 30 MIN: CPT | Mod: 25

## 2024-04-12 PROCEDURE — 36415 COLL VENOUS BLD VENIPUNCTURE: CPT

## 2024-04-12 PROCEDURE — 99395 PREV VISIT EST AGE 18-39: CPT

## 2024-04-12 NOTE — HEALTH RISK ASSESSMENT
[Very Good] : ~his/her~  mood as very good [Yes] : Yes [Monthly or less (1 pt)] : Monthly or less (1 point) [Never (0 pts)] : Never (0 points) [No] : In the past 12 months have you used drugs other than those required for medical reasons? No [No falls in past year] : Patient reported no falls in the past year [0] : 2) Feeling down, depressed, or hopeless: Not at all (0) [PHQ-2 Negative - No further assessment needed] : PHQ-2 Negative - No further assessment needed [HIV Test offered] : HIV Test offered [None] : None [Alone] : lives alone [Employed] : employed [Single] : single [Feels Safe at Home] : Feels safe at home [Never] : Never [Hepatitis C test offered] : Hepatitis C test offered [de-identified] : social drinker [Audit-CScore] : 1 [de-identified] : yoga everyday [de-identified] : balanced diet [[id number]] : 0 [Change in mental status noted] : No change in mental status noted [Language] : denies difficulty with language [Behavior] : denies difficulty with behavior [Learning/Retaining New Information] : denies difficulty learning/retaining new information [Handling Complex Tasks] : denies difficulty handling complex tasks [Reasoning] : denies difficulty with reasoning [Spatial Ability and Orientation] : denies difficulty with spatial ability and orientation [Sexually Active] : not sexually active [PapSmearDate] : 2023 [PapSmearComments] : neg hpv 5/2023 [FreeTextEntry2] : teacher

## 2024-04-12 NOTE — HISTORY OF PRESENT ILLNESS
[FreeTextEntry1] : new patient [de-identified] : 37 year of age F presents for annual exam  obesity- patient currently taking Ozempic and has lost weight. Mentions father had multiple heart attacks in his 40s.   Denies any chest pain, shortness of breath,  nausea, vomiting , diarrhea or any urinary symptoms.   For the last few years since receiving her COVID-vaccine has notedWhen she gets sick she feels a tightness and some burning swelling sensation along the left axilla and it feels like a tightness into her neck Patient does yoga avidly She has no  B symptoms Mention to the endocrinologist who noted that if that is persistent she should see oncology She has no symptoms at this time but is noting that there has been an increase in frequency the symptoms do not necessarily occur after a specific viral infection  thyroid nodule  - sees endocrinology  - Dr. Bandar Rios

## 2024-04-12 NOTE — PHYSICAL EXAM
[No Abdominal Bruit] : a ~M bruit was not heard ~T in the abdomen [Pedal Pulses Present] : the pedal pulses are present [No Edema] : there was no peripheral edema [Normal Posterior Cervical Nodes] : no posterior cervical lymphadenopathy [Normal] : affect was normal and insight and judgment were intact [de-identified] : single <1 cm lymph node

## 2024-04-12 NOTE — ASSESSMENT
[FreeTextEntry1] : patient with appropriate preventative UTD  no concerns on exam  age appropriate counseling given   on Ozempic - discussed risk benefit of Ozempic- doing well-0 cont wt loss management  on atorvastatin due to very large amount of lipid  - rec monitoring today  - continue atorvastatin based on sig family history   prediabetes- on Ozempic now check a1c   Lymph nodes Concerned that these are lymph nodes that are enlarging periodically No lymph nodes palpated on exam today in the axilla or posterior cervical chain Have recommended that the patient get labs today including ELADIA ESR CRP Ordering the [facility] serology Would have the patient do imaging if we find any abnormality Also may want to consider earlier mammo or ultrasound patient is turning 39 right around the time she would need Considering screenings anyway

## 2024-04-16 ENCOUNTER — TRANSCRIPTION ENCOUNTER (OUTPATIENT)
Age: 39
End: 2024-04-16

## 2024-04-17 ENCOUNTER — TRANSCRIPTION ENCOUNTER (OUTPATIENT)
Age: 39
End: 2024-04-17

## 2024-04-17 LAB
ALBUMIN SERPL ELPH-MCNC: 4.9 G/DL
ALP BLD-CCNC: 71 U/L
ALT SERPL-CCNC: 14 U/L
ANA SER IF-ACNC: NEGATIVE
ANION GAP SERPL CALC-SCNC: 13 MMOL/L
AST SERPL-CCNC: 17 U/L
BILIRUB SERPL-MCNC: 0.8 MG/DL
BUN SERPL-MCNC: 10 MG/DL
CALCIUM SERPL-MCNC: 9.5 MG/DL
CHLORIDE SERPL-SCNC: 103 MMOL/L
CHOLEST SERPL-MCNC: 201 MG/DL
CO2 SERPL-SCNC: 23 MMOL/L
CREAT SERPL-MCNC: 0.83 MG/DL
CRP SERPL-MCNC: 4 MG/L
EBV EA AB SER IA-ACNC: 51.6 U/ML
EBV EA AB TITR SER IF: POSITIVE
EBV EA IGG SER QL IA: >600 U/ML
EBV EA IGG SER-ACNC: POSITIVE
EBV EA IGM SER IA-ACNC: NEGATIVE
EBV PATRN SPEC IB-IMP: NORMAL
EBV VCA IGG SER IA-ACNC: 179 U/ML
EBV VCA IGM SER QL IA: <10 U/ML
EGFR: 92 ML/MIN/1.73M2
EPSTEIN-BARR VIRUS CAPSID ANTIGEN IGG: POSITIVE
ERYTHROCYTE [SEDIMENTATION RATE] IN BLOOD BY WESTERGREN METHOD: 18 MM/HR
ESTIMATED AVERAGE GLUCOSE: 100 MG/DL
GLUCOSE SERPL-MCNC: 86 MG/DL
HBA1C MFR BLD HPLC: 5.1 %
HCT VFR BLD CALC: 45.7 %
HDLC SERPL-MCNC: 84 MG/DL
HGB BLD-MCNC: 15 G/DL
LDLC SERPL CALC-MCNC: 99 MG/DL
MCHC RBC-ENTMCNC: 28.6 PG
MCHC RBC-ENTMCNC: 32.8 GM/DL
MCV RBC AUTO: 87 FL
NONHDLC SERPL-MCNC: 118 MG/DL
PLATELET # BLD AUTO: 281 K/UL
POTASSIUM SERPL-SCNC: 4.6 MMOL/L
PROT SERPL-MCNC: 7.3 G/DL
RBC # BLD: 5.25 M/UL
RBC # FLD: 12.5 %
SODIUM SERPL-SCNC: 139 MMOL/L
TRIGL SERPL-MCNC: 107 MG/DL
TSH SERPL-ACNC: 2.67 UIU/ML
WBC # FLD AUTO: 6.68 K/UL

## 2024-04-25 ENCOUNTER — RESULT REVIEW (OUTPATIENT)
Age: 39
End: 2024-04-25

## 2024-06-18 ENCOUNTER — NON-APPOINTMENT (OUTPATIENT)
Age: 39
End: 2024-06-18

## 2024-06-18 ENCOUNTER — APPOINTMENT (OUTPATIENT)
Dept: OBGYN | Facility: CLINIC | Age: 39
End: 2024-06-18
Payer: COMMERCIAL

## 2024-06-18 VITALS
SYSTOLIC BLOOD PRESSURE: 120 MMHG | DIASTOLIC BLOOD PRESSURE: 68 MMHG | HEIGHT: 65 IN | BODY MASS INDEX: 27.32 KG/M2 | WEIGHT: 164 LBS

## 2024-06-18 DIAGNOSIS — Z01.419 ENCOUNTER FOR GYNECOLOGICAL EXAMINATION (GENERAL) (ROUTINE) W/OUT ABNORMAL FINDINGS: ICD-10-CM

## 2024-06-18 DIAGNOSIS — Z12.31 ENCOUNTER FOR SCREENING MAMMOGRAM FOR MALIGNANT NEOPLASM OF BREAST: ICD-10-CM

## 2024-06-18 PROBLEM — R73.03 PREDIABETES: Status: ACTIVE | Noted: 2023-04-11

## 2024-06-18 PROBLEM — E78.5 HLD (HYPERLIPIDEMIA): Status: ACTIVE | Noted: 2022-05-25

## 2024-06-18 PROBLEM — E66.9 OBESITY, CLASS I, BMI 30-34.9: Status: ACTIVE | Noted: 2022-05-25

## 2024-06-18 LAB
CORE LAB BIOPSY: NORMAL
CYTOLOGY CVX/VAG DOC THIN PREP: NORMAL
HCG UR QL: NEGATIVE
HPV HIGH+LOW RISK DNA PNL CVX: NOT DETECTED
QUALITY CONTROL: YES

## 2024-06-18 PROCEDURE — 99395 PREV VISIT EST AGE 18-39: CPT

## 2024-06-19 ENCOUNTER — APPOINTMENT (OUTPATIENT)
Dept: BARIATRICS/WEIGHT MGMT | Facility: CLINIC | Age: 39
End: 2024-06-19
Payer: COMMERCIAL

## 2024-06-19 VITALS — WEIGHT: 163 LBS | HEIGHT: 65 IN | BODY MASS INDEX: 27.16 KG/M2

## 2024-06-19 DIAGNOSIS — E78.5 HYPERLIPIDEMIA, UNSPECIFIED: ICD-10-CM

## 2024-06-19 DIAGNOSIS — E66.9 OBESITY, UNSPECIFIED: ICD-10-CM

## 2024-06-19 DIAGNOSIS — R73.03 PREDIABETES.: ICD-10-CM

## 2024-06-19 LAB — HPV HIGH+LOW RISK DNA PNL CVX: NOT DETECTED

## 2024-06-19 PROCEDURE — 99214 OFFICE O/P EST MOD 30 MIN: CPT

## 2024-06-19 PROCEDURE — G2211 COMPLEX E/M VISIT ADD ON: CPT

## 2024-06-19 RX ORDER — SEMAGLUTIDE 2.68 MG/ML
8 INJECTION, SOLUTION SUBCUTANEOUS
Qty: 9 | Refills: 0 | Status: ACTIVE | COMMUNITY
Start: 2023-01-04 | End: 1900-01-01

## 2024-06-19 RX ORDER — DESOGESTREL AND ETHINYL ESTRADIOL 0.15-0.03
0.15-3 KIT ORAL
Qty: 84 | Refills: 3 | Status: ACTIVE | COMMUNITY
Start: 2019-07-19

## 2024-06-19 NOTE — HISTORY OF PRESENT ILLNESS
[TextBox_4] : 39yo P0 here for routine annual/ former LC then VD pt. Has lost 40lbs with medical weight loss/ doing well. Reg periods on OCP and happy with method.  She went on it as a teenager for heavy irreg periods and ovarian cysts and has had no problems since then. Not currently sexually active.  Has no gyn complaints.  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

## 2024-06-20 NOTE — ASSESSMENT
[FreeTextEntry1] : 38F PMH class I obesity, HLD, s/p cholecystectomy, ?pre-DM, GERD who presents to weight management for follow-up.  # Class I obesity c/b HLD, pre-DM, GERD, hx cholecystectomy: Weight today 163 lbs, BMI 27.12, regained 3 lbs since last visit, despite that she had gone up to 2 mg semaglutide (from 1.5 mg). Does note she's been drinking less alcohol, but struggling with sweets. Consistently doing yoga. We discussed planning/prepping her meals to have healthy foods ready and avoid junk. Tracking. Incorporating resistance exercise  - Food log - Start with vegetables / protein in her meal - Pre-portion meals - Focus on un-processed whole foods, plant-forward - Eating mindfully, primarily at meals - Adding cardio/weights - Minimize artificial sweeteners, water is best. - C/w ozempic 2 mg/wk - F/u in 3-6 months.

## 2024-06-20 NOTE — HISTORY OF PRESENT ILLNESS
[Home] : at home, [unfilled] , at the time of the visit. [Medical Office: ([facility])___] : at the medical office located in  [Verbal consent obtained from patient] : the patient, [unfilled] [FreeTextEntry1] : 38F PMH class I obesity, HLD, s/p cholecystectomy, ?pre-DM, GERD who presents to weight management for follow-up.  She initially presented 5/2022 reporting hx of "yoyo" w/diet and weight on numerous episodes on weight watchers and has tried multiple other methods as well. Her weight tends to drop to ~160's but often come back to 190's. She had found it substantially more difficult to lose weight on her most recent episode of weight watchers than she had in the past.  Her diet was noted for some processed/red meats (ie chicken sausage, steak), sweets/snack-food (ie oreos), artificial sweeteners and liquid kcal (ie wine). Some social snacking/grazing non-mindful eating noted. She was active through daily yoga and walking her dog. No clear concerns for sleep habits were observed. She was recommended to start general food tracking (less focus on kcal/macronutrients than general intake), pre-portion meals, and focus on un-processed plant-forward whole foods. We discussed eating mindfully and primarily at meals, minimizing artificial sweeteners, and consider reducing EtOH intake as well. She was prescribed semaglutide.  Weight today: 163 lbs, BMI 27.12 Weight at Last Visit (1/9/24): 160 lbs, BMI 26.63 Weight at Initial Visit (5/25/22): 194 lbs, BMI 32.28  She went back up to semaglutide 2 mg 1-2 months ago. Didn't go down with increase. (She had gone down to 1 mg Ozempic and was able to maintain her weight for several months, but just gained a couple of 'holiday pounds', and up to 160 lbs.   Drinking less alcohol, 2-3 drinks per week. "Sweet cravings are off the charts" Struggling with sweets.   Consistently doing yoga.

## 2024-06-20 NOTE — PHYSICAL EXAM
[Obese, well nourished, in no acute distress] : obese, well nourished, in no acute distress [de-identified] : TEB visit

## 2024-06-22 LAB — CYTOLOGY CVX/VAG DOC THIN PREP: NORMAL

## 2024-07-08 RX ORDER — SEMAGLUTIDE 2.4 MG/.75ML
2.4 INJECTION, SOLUTION SUBCUTANEOUS
Qty: 3 | Refills: 0 | Status: ACTIVE | COMMUNITY
Start: 2024-07-08 | End: 1900-01-01

## 2024-07-26 ENCOUNTER — RX RENEWAL (OUTPATIENT)
Age: 39
End: 2024-07-26

## 2024-09-06 ENCOUNTER — RX RENEWAL (OUTPATIENT)
Age: 39
End: 2024-09-06

## 2024-10-28 ENCOUNTER — RESULT REVIEW (OUTPATIENT)
Age: 39
End: 2024-10-28

## 2024-11-07 ENCOUNTER — TRANSCRIPTION ENCOUNTER (OUTPATIENT)
Age: 39
End: 2024-11-07

## 2024-11-08 ENCOUNTER — RESULT REVIEW (OUTPATIENT)
Age: 39
End: 2024-11-08

## 2024-11-11 DIAGNOSIS — N63.10 UNSPECIFIED LUMP IN THE RIGHT BREAST, UNSPECIFIED QUADRANT: ICD-10-CM

## 2024-11-21 ENCOUNTER — TRANSCRIPTION ENCOUNTER (OUTPATIENT)
Age: 39
End: 2024-11-21

## 2024-11-22 ENCOUNTER — TRANSCRIPTION ENCOUNTER (OUTPATIENT)
Age: 39
End: 2024-11-22

## 2024-11-27 ENCOUNTER — APPOINTMENT (OUTPATIENT)
Dept: BARIATRICS/WEIGHT MGMT | Facility: CLINIC | Age: 39
End: 2024-11-27
Payer: COMMERCIAL

## 2024-11-27 VITALS — WEIGHT: 159 LBS | BODY MASS INDEX: 26.49 KG/M2 | HEIGHT: 65 IN

## 2024-11-27 DIAGNOSIS — R73.03 PREDIABETES.: ICD-10-CM

## 2024-11-27 DIAGNOSIS — E78.5 HYPERLIPIDEMIA, UNSPECIFIED: ICD-10-CM

## 2024-11-27 DIAGNOSIS — E66.811 OBESITY, CLASS 1: ICD-10-CM

## 2024-11-27 PROCEDURE — G2211 COMPLEX E/M VISIT ADD ON: CPT | Mod: NC

## 2024-11-27 PROCEDURE — 99214 OFFICE O/P EST MOD 30 MIN: CPT

## 2024-11-27 RX ORDER — SEMAGLUTIDE 2.4 MG/.75ML
2.4 INJECTION, SOLUTION SUBCUTANEOUS
Qty: 3 | Refills: 0 | Status: ACTIVE | COMMUNITY
Start: 2024-11-27 | End: 1900-01-01

## 2024-12-02 ENCOUNTER — RESULT REVIEW (OUTPATIENT)
Age: 39
End: 2024-12-02

## 2024-12-06 ENCOUNTER — NON-APPOINTMENT (OUTPATIENT)
Age: 39
End: 2024-12-06

## 2025-03-04 ENCOUNTER — RX RENEWAL (OUTPATIENT)
Age: 40
End: 2025-03-04

## 2025-03-11 ENCOUNTER — NON-APPOINTMENT (OUTPATIENT)
Age: 40
End: 2025-03-11

## 2025-03-12 ENCOUNTER — APPOINTMENT (OUTPATIENT)
Dept: BARIATRICS/WEIGHT MGMT | Facility: CLINIC | Age: 40
End: 2025-03-12
Payer: COMMERCIAL

## 2025-03-12 VITALS — WEIGHT: 158 LBS | BODY MASS INDEX: 26.33 KG/M2 | HEIGHT: 65 IN

## 2025-03-12 DIAGNOSIS — R73.03 PREDIABETES.: ICD-10-CM

## 2025-03-12 DIAGNOSIS — E78.5 HYPERLIPIDEMIA, UNSPECIFIED: ICD-10-CM

## 2025-03-12 DIAGNOSIS — E66.811 OBESITY, CLASS 1: ICD-10-CM

## 2025-03-12 PROCEDURE — 99214 OFFICE O/P EST MOD 30 MIN: CPT | Mod: 95

## 2025-04-28 ENCOUNTER — APPOINTMENT (OUTPATIENT)
Dept: FAMILY MEDICINE | Facility: CLINIC | Age: 40
End: 2025-04-28
Payer: COMMERCIAL

## 2025-04-28 ENCOUNTER — NON-APPOINTMENT (OUTPATIENT)
Age: 40
End: 2025-04-28

## 2025-04-28 VITALS
OXYGEN SATURATION: 98 % | WEIGHT: 165 LBS | HEIGHT: 65 IN | HEART RATE: 80 BPM | SYSTOLIC BLOOD PRESSURE: 110 MMHG | BODY MASS INDEX: 27.49 KG/M2 | DIASTOLIC BLOOD PRESSURE: 70 MMHG

## 2025-04-28 DIAGNOSIS — E78.5 HYPERLIPIDEMIA, UNSPECIFIED: ICD-10-CM

## 2025-04-28 DIAGNOSIS — Z00.00 ENCOUNTER FOR GENERAL ADULT MEDICAL EXAMINATION W/OUT ABNORMAL FINDINGS: ICD-10-CM

## 2025-04-28 DIAGNOSIS — R73.03 PREDIABETES.: ICD-10-CM

## 2025-04-28 PROCEDURE — 99396 PREV VISIT EST AGE 40-64: CPT

## 2025-04-28 PROCEDURE — 36415 COLL VENOUS BLD VENIPUNCTURE: CPT

## 2025-04-28 PROCEDURE — 93000 ELECTROCARDIOGRAM COMPLETE: CPT

## 2025-04-29 LAB
ALBUMIN SERPL ELPH-MCNC: 4.7 G/DL
ALP BLD-CCNC: 68 U/L
ALT SERPL-CCNC: 16 U/L
ANION GAP SERPL CALC-SCNC: 13 MMOL/L
AST SERPL-CCNC: 23 U/L
BILIRUB SERPL-MCNC: 0.3 MG/DL
BUN SERPL-MCNC: 10 MG/DL
CALCIUM SERPL-MCNC: 9.5 MG/DL
CHLORIDE SERPL-SCNC: 104 MMOL/L
CHOLEST SERPL-MCNC: 210 MG/DL
CO2 SERPL-SCNC: 23 MMOL/L
CREAT SERPL-MCNC: 0.72 MG/DL
EGFRCR SERPLBLD CKD-EPI 2021: 108 ML/MIN/1.73M2
ESTIMATED AVERAGE GLUCOSE: 103 MG/DL
GLUCOSE SERPL-MCNC: 88 MG/DL
HBA1C MFR BLD HPLC: 5.2 %
HDLC SERPL-MCNC: 85 MG/DL
LDLC SERPL-MCNC: 107 MG/DL
NONHDLC SERPL-MCNC: 125 MG/DL
POTASSIUM SERPL-SCNC: 4.6 MMOL/L
PROT SERPL-MCNC: 7.4 G/DL
SODIUM SERPL-SCNC: 140 MMOL/L
TRIGL SERPL-MCNC: 100 MG/DL

## 2025-05-19 ENCOUNTER — RX RENEWAL (OUTPATIENT)
Age: 40
End: 2025-05-19

## 2025-07-30 ENCOUNTER — TRANSCRIPTION ENCOUNTER (OUTPATIENT)
Age: 40
End: 2025-07-30

## 2025-08-27 ENCOUNTER — APPOINTMENT (OUTPATIENT)
Dept: OBGYN | Facility: CLINIC | Age: 40
End: 2025-08-27
Payer: COMMERCIAL

## 2025-08-27 VITALS
BODY MASS INDEX: 27.32 KG/M2 | DIASTOLIC BLOOD PRESSURE: 70 MMHG | SYSTOLIC BLOOD PRESSURE: 110 MMHG | WEIGHT: 164 LBS | HEIGHT: 65 IN

## 2025-08-27 DIAGNOSIS — Z01.419 ENCOUNTER FOR GYNECOLOGICAL EXAMINATION (GENERAL) (ROUTINE) W/OUT ABNORMAL FINDINGS: ICD-10-CM

## 2025-08-27 DIAGNOSIS — D24.2 BENIGN NEOPLASM OF LEFT BREAST: ICD-10-CM

## 2025-08-27 PROCEDURE — 99396 PREV VISIT EST AGE 40-64: CPT
